# Patient Record
Sex: FEMALE | Race: WHITE | NOT HISPANIC OR LATINO | URBAN - METROPOLITAN AREA
[De-identification: names, ages, dates, MRNs, and addresses within clinical notes are randomized per-mention and may not be internally consistent; named-entity substitution may affect disease eponyms.]

---

## 2017-06-03 ENCOUNTER — INPATIENT (INPATIENT)
Facility: HOSPITAL | Age: 52
LOS: 1 days | Discharge: HOME | DRG: 103 | End: 2017-06-05
Attending: HOSPITALIST | Admitting: HOSPITALIST
Payer: COMMERCIAL

## 2017-06-03 VITALS — WEIGHT: 134.92 LBS | OXYGEN SATURATION: 99 % | HEIGHT: 62 IN

## 2017-06-03 DIAGNOSIS — Z29.9 ENCOUNTER FOR PROPHYLACTIC MEASURES, UNSPECIFIED: ICD-10-CM

## 2017-06-03 DIAGNOSIS — H53.9 UNSPECIFIED VISUAL DISTURBANCE: ICD-10-CM

## 2017-06-03 DIAGNOSIS — Z90.710 ACQUIRED ABSENCE OF BOTH CERVIX AND UTERUS: Chronic | ICD-10-CM

## 2017-06-03 DIAGNOSIS — G43.909 MIGRAINE, UNSPECIFIED, NOT INTRACTABLE, WITHOUT STATUS MIGRAINOSUS: ICD-10-CM

## 2017-06-03 DIAGNOSIS — G43.109 MIGRAINE WITH AURA, NOT INTRACTABLE, WITHOUT STATUS MIGRAINOSUS: ICD-10-CM

## 2017-06-03 DIAGNOSIS — G45.9 TRANSIENT CEREBRAL ISCHEMIC ATTACK, UNSPECIFIED: ICD-10-CM

## 2017-06-03 DIAGNOSIS — M12.811 OTHER SPECIFIC ARTHROPATHIES, NOT ELSEWHERE CLASSIFIED, RIGHT SHOULDER: ICD-10-CM

## 2017-06-03 LAB
ALBUMIN SERPL ELPH-MCNC: 4.4 G/DL — SIGNIFICANT CHANGE UP (ref 3.3–5.2)
ALP SERPL-CCNC: 48 U/L — SIGNIFICANT CHANGE UP (ref 40–120)
ALT FLD-CCNC: 16 U/L — SIGNIFICANT CHANGE UP
ANION GAP SERPL CALC-SCNC: 13 MMOL/L — SIGNIFICANT CHANGE UP (ref 5–17)
APTT BLD: 29.4 SEC — SIGNIFICANT CHANGE UP (ref 27.5–37.4)
AST SERPL-CCNC: 23 U/L — SIGNIFICANT CHANGE UP
BASOPHILS # BLD AUTO: 0 K/UL — SIGNIFICANT CHANGE UP (ref 0–0.2)
BASOPHILS NFR BLD AUTO: 0.2 % — SIGNIFICANT CHANGE UP (ref 0–2)
BILIRUB SERPL-MCNC: 0.4 MG/DL — SIGNIFICANT CHANGE UP (ref 0.4–2)
BUN SERPL-MCNC: 12 MG/DL — SIGNIFICANT CHANGE UP (ref 8–20)
CALCIUM SERPL-MCNC: 9.1 MG/DL — SIGNIFICANT CHANGE UP (ref 8.6–10.2)
CHLORIDE SERPL-SCNC: 101 MMOL/L — SIGNIFICANT CHANGE UP (ref 98–107)
CO2 SERPL-SCNC: 26 MMOL/L — SIGNIFICANT CHANGE UP (ref 22–29)
CREAT SERPL-MCNC: 0.59 MG/DL — SIGNIFICANT CHANGE UP (ref 0.5–1.3)
CRP SERPL-MCNC: 0.1 MG/DL — SIGNIFICANT CHANGE UP (ref 0–0.4)
EOSINOPHIL # BLD AUTO: 0 K/UL — SIGNIFICANT CHANGE UP (ref 0–0.5)
EOSINOPHIL NFR BLD AUTO: 0.5 % — SIGNIFICANT CHANGE UP (ref 0–6)
ERYTHROCYTE [SEDIMENTATION RATE] IN BLOOD: 11 MM/HR — SIGNIFICANT CHANGE UP (ref 0–20)
GLUCOSE SERPL-MCNC: 126 MG/DL — HIGH (ref 70–115)
HBA1C BLD-MCNC: 5.1 % — SIGNIFICANT CHANGE UP (ref 4–5.6)
HCG SERPL-ACNC: <2 MIU/ML — SIGNIFICANT CHANGE UP
HCT VFR BLD CALC: 41 % — SIGNIFICANT CHANGE UP (ref 37–47)
HGB BLD-MCNC: 14.2 G/DL — SIGNIFICANT CHANGE UP (ref 12–16)
INR BLD: 0.96 RATIO — SIGNIFICANT CHANGE UP (ref 0.88–1.16)
LYMPHOCYTES # BLD AUTO: 1.6 K/UL — SIGNIFICANT CHANGE UP (ref 1–4.8)
LYMPHOCYTES # BLD AUTO: 25.7 % — SIGNIFICANT CHANGE UP (ref 20–55)
MCHC RBC-ENTMCNC: 31.6 PG — HIGH (ref 27–31)
MCHC RBC-ENTMCNC: 34.6 G/DL — SIGNIFICANT CHANGE UP (ref 32–36)
MCV RBC AUTO: 91.3 FL — SIGNIFICANT CHANGE UP (ref 81–99)
MONOCYTES # BLD AUTO: 0.7 K/UL — SIGNIFICANT CHANGE UP (ref 0–0.8)
MONOCYTES NFR BLD AUTO: 11.4 % — HIGH (ref 3–10)
NEUTROPHILS # BLD AUTO: 3.7 K/UL — SIGNIFICANT CHANGE UP (ref 1.8–8)
NEUTROPHILS NFR BLD AUTO: 61.9 % — SIGNIFICANT CHANGE UP (ref 37–73)
PLATELET # BLD AUTO: 276 K/UL — SIGNIFICANT CHANGE UP (ref 150–400)
POTASSIUM SERPL-MCNC: 4 MMOL/L — SIGNIFICANT CHANGE UP (ref 3.5–5.3)
POTASSIUM SERPL-SCNC: 4 MMOL/L — SIGNIFICANT CHANGE UP (ref 3.5–5.3)
PROT SERPL-MCNC: 6.9 G/DL — SIGNIFICANT CHANGE UP (ref 6.6–8.7)
PROTHROM AB SERPL-ACNC: 10.6 SEC — SIGNIFICANT CHANGE UP (ref 9.8–12.7)
RBC # BLD: 4.49 M/UL — SIGNIFICANT CHANGE UP (ref 4.4–5.2)
RBC # FLD: 13.2 % — SIGNIFICANT CHANGE UP (ref 11–15.6)
SODIUM SERPL-SCNC: 140 MMOL/L — SIGNIFICANT CHANGE UP (ref 135–145)
TROPONIN T SERPL-MCNC: <0.01 NG/ML — SIGNIFICANT CHANGE UP (ref 0–0.06)
WBC # BLD: 6 K/UL — SIGNIFICANT CHANGE UP (ref 4.8–10.8)
WBC # FLD AUTO: 6 K/UL — SIGNIFICANT CHANGE UP (ref 4.8–10.8)

## 2017-06-03 PROCEDURE — 93880 EXTRACRANIAL BILAT STUDY: CPT | Mod: 26

## 2017-06-03 PROCEDURE — 99223 1ST HOSP IP/OBS HIGH 75: CPT | Mod: GC

## 2017-06-03 PROCEDURE — 99285 EMERGENCY DEPT VISIT HI MDM: CPT

## 2017-06-03 PROCEDURE — 70450 CT HEAD/BRAIN W/O DYE: CPT | Mod: 26

## 2017-06-03 PROCEDURE — 93010 ELECTROCARDIOGRAM REPORT: CPT

## 2017-06-03 RX ORDER — ASPIRIN/CALCIUM CARB/MAGNESIUM 324 MG
325 TABLET ORAL ONCE
Qty: 0 | Refills: 0 | Status: COMPLETED | OUTPATIENT
Start: 2017-06-03 | End: 2017-06-03

## 2017-06-03 RX ORDER — ATORVASTATIN CALCIUM 80 MG/1
20 TABLET, FILM COATED ORAL AT BEDTIME
Qty: 0 | Refills: 0 | Status: DISCONTINUED | OUTPATIENT
Start: 2017-06-03 | End: 2017-06-04

## 2017-06-03 RX ORDER — HEPARIN SODIUM 5000 [USP'U]/ML
5000 INJECTION INTRAVENOUS; SUBCUTANEOUS EVERY 12 HOURS
Qty: 0 | Refills: 0 | Status: DISCONTINUED | OUTPATIENT
Start: 2017-06-03 | End: 2017-06-05

## 2017-06-03 RX ORDER — ASPIRIN/CALCIUM CARB/MAGNESIUM 324 MG
81 TABLET ORAL DAILY
Qty: 0 | Refills: 0 | Status: DISCONTINUED | OUTPATIENT
Start: 2017-06-03 | End: 2017-06-05

## 2017-06-03 RX ORDER — PANTOPRAZOLE SODIUM 20 MG/1
40 TABLET, DELAYED RELEASE ORAL
Qty: 0 | Refills: 0 | Status: DISCONTINUED | OUTPATIENT
Start: 2017-06-03 | End: 2017-06-05

## 2017-06-03 RX ADMIN — ATORVASTATIN CALCIUM 20 MILLIGRAM(S): 80 TABLET, FILM COATED ORAL at 22:03

## 2017-06-03 RX ADMIN — Medication 325 MILLIGRAM(S): at 13:05

## 2017-06-03 RX ADMIN — HEPARIN SODIUM 5000 UNIT(S): 5000 INJECTION INTRAVENOUS; SUBCUTANEOUS at 18:59

## 2017-06-03 NOTE — H&P ADULT - NSHPPHYSICALEXAM_GEN_ALL_CORE
Gen: middle aged female in nad   HEENT: eomi, perrla, neck supple   CVS: s1s2nl, no m/r/g RRR  Lungs: clear   Abd: soft, nt, nd, bs +  Ext: no c/c/e  Neuro: AAOx3, CN II-XII grossly intact, MOtor b/l all ext 5/5, no nystagmus. Sensory intact

## 2017-06-03 NOTE — H&P ADULT - NSHPLABSRESULTS_GEN_ALL_CORE
14.2   6.0   )-----------( 276      ( 03 Jun 2017 12:19 )             41.0   06-03    140  |  101  |  12.0  ----------------------------<  126<H>  4.0   |  26.0  |  0.59    Ca    9.1      03 Jun 2017 12:19    TPro  6.9  /  Alb  4.4  /  TBili  0.4  /  DBili  x   /  AST  23  /  ALT  16  /  AlkPhos  48  06-03    ct brain: negative

## 2017-06-03 NOTE — ED ADULT NURSE REASSESSMENT NOTE - NS ED NURSE REASSESS COMMENT FT1
Dr Ann, admitting MD at bedside to admit pt.
Pt sitting in stretcher, A & Ox3, respirations are even & unlabored. Pt offers no complaints at this time. Waiting for room assignment, aware of plan of care.

## 2017-06-03 NOTE — ED PROVIDER NOTE - NS ED ROS FT
Review of Systems:  	•	CONSTITUTIONAL : no fever or weight change  	•	SKIN : no rash  	•	HEMATOLOGIC : no petechia, no bruising  	•	EYES : no eye pain, no blurred vision  	•	ENT : no change in hearing, no sore throat  	•	RESPIRATORY : no shortness of breath, no cough  	•	CARDIAC : no chest pain, no palpitations  	•	GI : no abd pain, no nausea, no vomiting, no diarrhea, no constipation, no bleeding   	•	MUSCULOSKELETAL : right shoulder pain  	•	NEUROLOGIC : blurry vision of right eye

## 2017-06-03 NOTE — ED ADULT TRIAGE NOTE - CHIEF COMPLAINT QUOTE
Patient arrived to ED today with c/o losing vision in her right eye for about 10 minutes at 0930 this morning.  Patient states vision was blurry, then black, then when vision started to return it was distorted.

## 2017-06-03 NOTE — H&P ADULT - PROBLEM SELECTOR PLAN 2
unlikely to be solely a migraine with aura at this time, Would want to ensure that she is not having a tia   may benefit due to age, sex and hx to have a carotid bubble study to eval for pfo

## 2017-06-03 NOTE — STROKE CODE NOTE - ABSOLUTE EXCLUSION OTHER CRITERIA
Dr Morales Durham, Neuro telestroke states pt will not benefit from tPA, symptoms resolved at this time and CT scan Negative, however MRI/MRA ordered for further confirmation.

## 2017-06-03 NOTE — ED PROVIDER NOTE - MEDICAL DECISION MAKING DETAILS
Pt with visual changes that have resolved. NIH stroke scale 0. CT negative. Consulted with Dr. Durham neurology who recommends admission MRI/MRA and aspirin. r/o TIA vs complicated migraine. Pt with visual changes that have resolved. NIH stroke scale 0. CT negative. Consulted with Dr. Durham neurology who recommends admission MRI/MRA and aspirin. r/o TIA vs migraine with aura.

## 2017-06-03 NOTE — CONSULT NOTE ADULT - SUBJECTIVE AND OBJECTIVE BOX
HPI: 52 y/o RH female who follows with Dr José at [facility] with pmhx of complicated migraine, breast lump s/p resection with marker left in for follow up who, recently dx right UE mild carpel tunnel and right rotator cuff arthritis who presents with complaint of transient right blurry vision. Per patient, she woke up in her USOH, was folding laundry and felt that her right eye became blurry, then loss of vision completely (only right eye) and then finally with blurry vision where she felt that she was looking through a stained/sparkly glass. She states that this lasted about 10-15 seconds and then subsided. She went up the stairs to see her   and then decided to come to the ER. She has been having chronic right upper extremity numbness and tingling with her rotator cuff arthritis and was recently dx with mild carpel tunnel syndrome. She felt that during this episode, her numbness was more pronounced.   Everything subsided by the time she came to the ER   Prior MRI brain for migraines when they first started, never had this degree of sx with her migraines in the past and was last performed appx 7 yrs ago as per pt.  Now fatigue and called for neurological evaluation.      PAST MEDICAL & SURGICAL HISTORY:  Rotator cuff arthropathy, right  Migraines  H/O abdominal hysterectomy: 10 yrs ago    MEDICATIONS  (STANDING):  heparin  Injectable 5000Unit(s) SubCutaneous every 12 hours  pantoprazole    Tablet 40milliGRAM(s) Oral before breakfast  atorvastatin 20milliGRAM(s) Oral at bedtime  aspirin enteric coated 81milliGRAM(s) Oral daily    MEDICATIONS  (PRN):    Allergies    No Known Allergies    Intolerances        FAMILY HISTORY:  No pertinent family history in first degree relatives          SOCIAL HISTORY:  Denies toxic habits;     REVIEW OF SYSTEMS:    As noted in the HPI.    VITAL SIGNS:  Vital Signs Last 24 Hrs  T(C): 36.6, Max: 36.6 (06-03 @ 11:34)  T(F): 97.9, Max: 97.9 (06-03 @ 11:34)  HR: 67 (67 - 78)  BP: 110/63 (110/63 - 124/68)  BP(mean): --  RR: 20 (18 - 20)  SpO2: 99% (99% - 100%)    PHYSICAL EXAMINATION:  General: Well-developed, well nourished, in no acute distress.  Eyes: Conjunctiva and sclera clear. Fundoscopic examination was deferred.  Neck: Supple.  Cardiac: +S1 & S2; Regular.  Chest: CTA b/l.    Musculoskeletal: No tenderness on palpation of spine.   No Brudzinski/Kernig's sign.    Neurologic:  - Mental Status:  Alert, awake, oriented to person, place, and time; Speech is fluent with intact naming, repetition, and comprehension  Cranial Nerves II-XII:    II:  Visual acuity is normal for age ; Visual fields are full to confrontation; Pupils are equal, round, and reactive to light.  III, IV, VI:  Extraocular movements are intact without nystagmus.  V:  Facial sensation is intact in the V1-V3 distribution bilaterally.  VII:  Face is symmetric with normal eye closure and smile  VIII:  Hearing is intact and symmetric for age  IX, X:  Uvula is midline and soft palate rises symmetrically  XI:  Head turning and shoulder shrug are intact.  XII:  Tongue protrudes in the midline.  - Motor:  Strength is 5/5 throughout.  There is no pronator drift.  Normal muscle bulk and tone throughout.  - Reflexes:  2+ and symmetric throughout.  - Sensory:  Intact and symmetric to light touch, and joint-position sense.  - Coordination:  No dysmetria/dysdiadochokinesis.   - Gait: Deferred.      LABS:                          14.2   6.0   )-----------( 276      ( 03 Jun 2017 12:19 )             41.0     03 Jun 2017 12:19    140    |  101    |  12.0   ----------------------------<  126    4.0     |  26.0   |  0.59     Ca    9.1        03 Jun 2017 12:19    TPro  6.9    /  Alb  4.4    /  TBili  0.4    /  DBili  x      /  AST  23     /  ALT  16     /  AlkPhos  48     03 Jun 2017 12:19    LIVER FUNCTIONS - ( 03 Jun 2017 12:19 )  Alb: 4.4 g/dL / Pro: 6.9 g/dL / ALK PHOS: 48 U/L / ALT: 16 U/L / AST: 23 U/L / GGT: x           PT/INR - ( 03 Jun 2017 12:19 )   PT: 10.6 sec;   INR: 0.96 ratio         PTT - ( 03 Jun 2017 12:19 )  PTT:29.4 sec      RADIOLOGY & ADDITIONAL STUDIES:      CT head without contrast 6/3/17 - no acute intracranial pathology.    IMPRESSION:  Transient visual disturbance with dysthesia likely Migraine related.  Nonetheless to further assess for TIA.

## 2017-06-03 NOTE — H&P ADULT - HISTORY OF PRESENT ILLNESS
Patient is a 52 y/o female with pmhx of complicated migraine, breast lump s/p resection with marker left in for follow up who, recently dx right UE mild carpel tunnel and right rotator cuff arthiritis who presents with complaint of transient right blurry vision. Per patient, she woke up in her USOH, was folding laundry (not bending over) and felt that her right eye became blurry, then loss of vision completely (only right eye) and then finally with blurry vision where she felt that she was looking through a stained/sparkly glass. She states that this lasted about 10 minutes and then subsided. She went up the stairs to see her  and tell him what was happening and then decided to come to the ER. She has been having chronic right upper extremity numbness and tingling with her rotator cuff arthiritis and was recently dx with mild carpel tunnel syndrome. She felt that during this episode, her numbness was more pronounced.   Everything subsided by the time she came to the ER   Prior MRI brain for migraines when they first started, never had this degree of sx with her migraines in the past.   No headache now

## 2017-06-03 NOTE — STROKE CODE NOTE - PROBLEM SELECTOR PLAN 1
Plan:   - Risks, benefits and adverse reactions associated with IV tPA including hemorrhagic stroke were discussed with patient in detail. As she reports feeling back to normal and without any focal neurological findings on exam, risks of IV tPA would outweigh the potential benefits and she agreed up on not being treated wit IV tPA   - Start with Aspirin   - MRI brain   - Further evaluation and management as per neurologist at [facility]     Above mentioned plan was discussed with patient and [facility] ED MD in detail. All the questions were answered and concerns were addressed.

## 2017-06-03 NOTE — ED STATDOCS - DETAILS:
I, Lynnette Ny, performed the initial face to face bedside interview with this patient regarding history of present illness, review of symptoms and relevant past medical, social and family history.  I completed an independent physical examination.  I was the initial provider who evaluated this patient.   The history, relevant review of systems, past medical and surgical history, medical decision making, and physical examination was documented by the scribe in my presence and I attest to the accuracy of the documentation.

## 2017-06-03 NOTE — H&P ADULT - NSHPREVIEWOFSYSTEMS_GEN_ALL_CORE
No chest pain, palpitations, sob, light headedness/dizziness, difficulty breathing/cough, fevers/chills, abdominal pain, n/v, diarrhea/constipation, dysuria or increased urinary frequency.  No recent illness, sick contacts, travel   No h/o anxiety

## 2017-06-03 NOTE — STROKE CODE NOTE - ASSESSMENT/PLAN
50 Y/O woman with history of migraines is seen in the [facility] ED through tele-stroke for evaluation and management of R eye vision disturbance. She reports complete resolution of her neurological symptoms and her neurological exam is non focal. CT brain did not show any acute intracranial pathology.     Impression:  Migraine with aura vs. cerebral/retinal embolism without retinal or cerebral infarction – TIA/amaurosis fugax.

## 2017-06-03 NOTE — ED ADULT NURSE NOTE - OBJECTIVE STATEMENT
PT c/o sudden onset of blurred vision in the right eye where eye sight went blank and pt states she was unable to see then vision returned and she states she saw "sparkles" pt states her vision at this time has returned to normal, pt states she also had slight headache but denies headache at this time. Pt is A & O x3, respirations are even & unlabored.  Pt states she has torn rotator cuff and numbness in normal in her right arm, however pt states it feels worse now but also admits to painting yesterday. Pt was evaluated by Dr Morales Durham neuro MD via telestroke and will be admitted for MRI/MRA further stroke workup.  Pt is aware of plan of care.

## 2017-06-03 NOTE — H&P ADULT - PROBLEM SELECTOR PLAN 1
r/o cva vs tia  admit to stroke unit   neurochecks as routine - NIH 0  Neuro eval called Dr Rodrigues [facility]   lipid panel, tsh, a1c   aspirin, statin   MRI brain   echo  US carotids

## 2017-06-03 NOTE — H&P ADULT - ASSESSMENT
Patient is a 52 y/o female with pmhx of complicated migraine, breast lump s/p resection with marker left in for follow up who, recently dx right UE mild carpel tunnel and right rotator cuff arthiritis who presents with complaint of transient right blurry vision. r/o CVA. Seen by tele stroke who rec admission

## 2017-06-03 NOTE — CONSULT NOTE ADULT - PROBLEM SELECTOR RECOMMENDATION 9
TIA protocol/monitored bed.  Baby ASA now and avoid triptans.  MRI Brain/MRA head/neck.  Echo.  DVT prophylaxis.  D/w pt.  Blood serology.  Will follow.

## 2017-06-03 NOTE — STROKE CODE NOTE - SUBJECTIVE
52 Y/O woman was last normal at around 9:30 AM, when she noticed vision disturbance in the R eye (mono-ocular as she tried to close one of the eyes at a time). She started noticing sparkles in the R eye vision associated with blurring of the vision, which progressed rapidly to loss of vision followed by gradual improvement in the vision back to normal. Total episode lasted for about 10 minutes with complete resolution of the vision disturbance. Of note, she also repots to have R hand/forearm sensory paresthesia lasting off and on since last few months, which were diagnosed as CTS. Her sensory paresthesia has been going on since at least yesterday night.

## 2017-06-03 NOTE — ED STATDOCS - PROGRESS NOTE DETAILS
50 y/o F presents to the ED c/o sudden onset visual changes and tingling and numbness to the right upper and lower extremities at 0930 today. Some visual return described as "seeing sparkly lights." Code stroke called immediately and pt sent to Main A side for further evaluation.

## 2017-06-03 NOTE — ED PROVIDER NOTE - OBJECTIVE STATEMENT
Pt presents to the ED with complaints of right eye visual disturbance. States around 9:30 am developed blurry vision which then went black then sparkles in her vision of the right eye. States its completely normal now. She has also had some tingling/numbness sensation from her right hand digits to forearm which she has had intermittently for extended period of time. States that she had and mri and saw a neurologist and symptoms felt to come from her rotator cuff. She also has a cervical disc bulge. She has hx of migraines.

## 2017-06-04 DIAGNOSIS — E04.1 NONTOXIC SINGLE THYROID NODULE: ICD-10-CM

## 2017-06-04 LAB
ANION GAP SERPL CALC-SCNC: 13 MMOL/L — SIGNIFICANT CHANGE UP (ref 5–17)
BUN SERPL-MCNC: 12 MG/DL — SIGNIFICANT CHANGE UP (ref 8–20)
CALCIUM SERPL-MCNC: 8.9 MG/DL — SIGNIFICANT CHANGE UP (ref 8.6–10.2)
CHLORIDE SERPL-SCNC: 103 MMOL/L — SIGNIFICANT CHANGE UP (ref 98–107)
CHOLEST SERPL-MCNC: 209 MG/DL — HIGH (ref 110–199)
CO2 SERPL-SCNC: 23 MMOL/L — SIGNIFICANT CHANGE UP (ref 22–29)
CREAT SERPL-MCNC: 0.68 MG/DL — SIGNIFICANT CHANGE UP (ref 0.5–1.3)
GLUCOSE SERPL-MCNC: 100 MG/DL — SIGNIFICANT CHANGE UP (ref 70–115)
HBA1C BLD-MCNC: 5.2 % — SIGNIFICANT CHANGE UP (ref 4–5.6)
HDLC SERPL-MCNC: 66 MG/DL — SIGNIFICANT CHANGE UP
LIPID PNL WITH DIRECT LDL SERPL: 125 MG/DL — SIGNIFICANT CHANGE UP
POTASSIUM SERPL-MCNC: 3.7 MMOL/L — SIGNIFICANT CHANGE UP (ref 3.5–5.3)
POTASSIUM SERPL-SCNC: 3.7 MMOL/L — SIGNIFICANT CHANGE UP (ref 3.5–5.3)
SODIUM SERPL-SCNC: 139 MMOL/L — SIGNIFICANT CHANGE UP (ref 135–145)
TOTAL CHOLESTEROL/HDL RATIO MEASUREMENT: 3 RATIO — LOW (ref 3.3–7.1)
TRIGL SERPL-MCNC: 91 MG/DL — SIGNIFICANT CHANGE UP (ref 10–200)
TSH SERPL-MCNC: 1.08 UIU/ML — SIGNIFICANT CHANGE UP (ref 0.27–4.2)

## 2017-06-04 PROCEDURE — 99233 SBSQ HOSP IP/OBS HIGH 50: CPT

## 2017-06-04 PROCEDURE — 70551 MRI BRAIN STEM W/O DYE: CPT | Mod: 26

## 2017-06-04 PROCEDURE — 70547 MR ANGIOGRAPHY NECK W/O DYE: CPT | Mod: 26

## 2017-06-04 PROCEDURE — 70544 MR ANGIOGRAPHY HEAD W/O DYE: CPT | Mod: 26,59

## 2017-06-04 RX ORDER — ASPIRIN/CALCIUM CARB/MAGNESIUM 324 MG
1 TABLET ORAL
Qty: 0 | Refills: 0 | DISCHARGE
Start: 2017-06-04

## 2017-06-04 RX ORDER — SIMVASTATIN 20 MG/1
1 TABLET, FILM COATED ORAL
Qty: 30 | Refills: 0
Start: 2017-06-04 | End: 2017-07-04

## 2017-06-04 RX ORDER — SIMVASTATIN 20 MG/1
10 TABLET, FILM COATED ORAL AT BEDTIME
Qty: 0 | Refills: 0 | Status: DISCONTINUED | OUTPATIENT
Start: 2017-06-04 | End: 2017-06-05

## 2017-06-04 RX ADMIN — HEPARIN SODIUM 5000 UNIT(S): 5000 INJECTION INTRAVENOUS; SUBCUTANEOUS at 17:49

## 2017-06-04 RX ADMIN — PANTOPRAZOLE SODIUM 40 MILLIGRAM(S): 20 TABLET, DELAYED RELEASE ORAL at 06:23

## 2017-06-04 RX ADMIN — Medication 81 MILLIGRAM(S): at 11:32

## 2017-06-04 RX ADMIN — SIMVASTATIN 10 MILLIGRAM(S): 20 TABLET, FILM COATED ORAL at 20:47

## 2017-06-04 RX ADMIN — HEPARIN SODIUM 5000 UNIT(S): 5000 INJECTION INTRAVENOUS; SUBCUTANEOUS at 06:23

## 2017-06-04 NOTE — DISCHARGE NOTE ADULT - PATIENT PORTAL LINK FT
“You can access the [facility] Patient Portal, offered by [facility], by registering with the following website: [email or web address]”

## 2017-06-04 NOTE — DISCHARGE NOTE ADULT - NS AS DC STROKE ED MATERIALS
Risk Factors for Stroke/Prescribed Medications/Call 911 for Stroke/Stroke Warning Signs and Symptoms/Stroke Education Booklet/Need for Followup After Discharge

## 2017-06-04 NOTE — DISCHARGE NOTE ADULT - MEDICATION SUMMARY - MEDICATIONS TO TAKE
I will START or STAY ON the medications listed below when I get home from the hospital:    aspirin 81 mg oral delayed release tablet  -- 1 tab(s) by mouth once a day  -- Indication: For Prophylactic measure    simvastatin 10 mg oral tablet  -- 1 tab(s) by mouth once a day (at bedtime)  -- Indication: For High cholesterol    multivitamin  -- 1 tab(s) by mouth once a day  -- Indication: For nutritional

## 2017-06-04 NOTE — DISCHARGE NOTE ADULT - OTHER SIGNIFICANT FINDINGS
ct brain:   IMPRESSION: No acute intracranial hemorrhage, mass effect, or midline   shift.    If there is clinical suspicion of acute ischemia, consider further   evaluation with MRI examination, if there are no clinical   contraindications.    US carotids:       IMPRESSION:    1.  Right carotid artery: No evidence of hemodynamically significant   stenosis.   2.  Left carotid artery: No evidence of hemodynamically significant   stenosis.  3.  Vertebral arteries: Antegrade flow.  4.  Other: Multiple thyroid nodules, measuring up to 2.4 cm on the left.   Consider further evaluation with thyroid ultrasound and biopsy as   clinically warranted. ct brain:   IMPRESSION: No acute intracranial hemorrhage, mass effect, or midline   shift.    If there is clinical suspicion of acute ischemia, consider further   evaluation with MRI examination, if there are no clinical   contraindications.    US carotids:       IMPRESSION:    1.  Right carotid artery: No evidence of hemodynamically significant   stenosis.   2.  Left carotid artery: No evidence of hemodynamically significant   stenosis.  3.  Vertebral arteries: Antegrade flow.  4.  Other: Multiple thyroid nodules, measuring up to 2.4 cm on the left.   Consider further evaluation with thyroid ultrasound and biopsy as   clinically warranted.    MRI brain, MRA head/neck: FINDINGS:  There is no abnormal restricted diffusion to suggest acute infarction.   Normal signal is demonstrated throughout the brain parenchyma. Normal T2   flow-voids are seen within  the intracranial vasculature. The lateral   ventricles and cortical sulci are age-appropriate in size and   configuration. There is no mass, mass effect, or extra-axial fluid   collection. There is no susceptibility artifact to suggest hemorrhage.   Midline structures are normal.  The visualized paranasal sinuses, mastoid   air cells and orbits are normal. Tiny nonspecific bilateral parotid gland   T2 hyperintensities likely representing tiny lymph nodes.      IMPRESSION: No acute infarction. Unremarkable MRA     Echo - pending read - to be called with result

## 2017-06-04 NOTE — DISCHARGE NOTE ADULT - CARE PROVIDER_API CALL
Nadir Nielsen), Neurology  [address] [place] [address]  Phone: [phone]  Fax: [phone]    Mark Nielsen (MATIAS), Internal Medicine  [address] [place] [address]  Phone: [phone]  Fax: [phone]

## 2017-06-04 NOTE — DISCHARGE NOTE ADULT - HOSPITAL COURSE
Patient is a 52 y/o female with pmhx of complicated migraine, breast lump s/p resection with marker left in for follow up who, recently dx right UE mild carpel tunnel and right rotator cuff arthiritis who presents with complaint of transient right blurry vision. r/o CVA. Seen by tele stroke who rec admission. All symptoms resolved within arrival to ED. CT brain negative, US carotids negative but with thyroid nodules - rec OP thyroid US on discharge as TSH normal.  and statin to continue at lower less potent medication. Visual disturbances resolved. RIght arm numbness chronic and related to mild carpel tunnel - f/up with PT as OP   For migraine - rec Neuro f/up for possible carotid bubble study     MRI done -      Plan d/w patient.   Total time coordinating this discharge was 40 minutes. Patient is a 52 y/o female with pmhx of complicated migraine, breast lump s/p resection with marker left in for follow up who, recently dx right UE mild carpel tunnel and right rotator cuff arthiritis who presents with complaint of transient right blurry vision. r/o CVA. Seen by tele stroke who rec admission. All symptoms resolved within arrival to ED. CT brain negative, US carotids negative but with thyroid nodules - rec OP thyroid US on discharge as TSH normal.  and statin to continue at lower less potent medication. Visual disturbances resolved. RIght arm numbness chronic and related to mild carpel tunnel - f/up with PT as OP   For migraine - rec Neuro f/up for possible carotid bubble study     MRI done - negative. Echo done and pending read - to be called in to patient      Plan d/w patient.   Total time coordinating this discharge was 40 minutes.

## 2017-06-04 NOTE — PROGRESS NOTE ADULT - ASSESSMENT
Patient is a 50 y/o female with pmhx of complicated migraine, breast lump s/p resection with marker left in for follow up who, recently dx right UE mild carpel tunnel and right rotator cuff arthiritis who presents with complaint of transient right blurry vision. r/o CVA. Seen by tele stroke who rec admission

## 2017-06-04 NOTE — DISCHARGE NOTE ADULT - ADDITIONAL INSTRUCTIONS
f/up with your primary care provider in 2-3 days. You stated you would like to see a different MD - name is below for ASHLY Nielsen   f/up with Neurology in 1-2 weeks Dr Nielsen

## 2017-06-04 NOTE — DISCHARGE NOTE ADULT - CARE PLAN
Principal Discharge DX:	TIA (transient ischemic attack)  Goal:	follow up with neurology  Instructions for follow-up, activity and diet:	-continue to take simvastatin as your LDL level is a little high as below   -repeat lipid panel In 6 weeks  -daily aspirin until the rest of your migraine work up is complete  -low fat diet   -echocardiogram as outpatient  Secondary Diagnosis:	Migraines  Instructions for follow-up, activity and diet:	-adequate sleep   -oral hydration   -follow up with neurology for a possible carotid bubble study to evaluate for PFO  Secondary Diagnosis:	Thyroid nodule  Instructions for follow-up, activity and diet:	-thyroid ultrasound as outpatient. See report below   TSH normal  Secondary Diagnosis:	Rotator cuff arthropathy, right  Instructions for follow-up, activity and diet:	-as needed tylenol   -follow up with Primary Care MD  Secondary Diagnosis:	Prophylactic measure  Instructions for follow-up, activity and diet:	-multivitamins

## 2017-06-04 NOTE — DISCHARGE NOTE ADULT - PLAN OF CARE
follow up with neurology -continue to take simvastatin as your LDL level is a little high as below   -repeat lipid panel In 6 weeks  -daily aspirin until the rest of your migraine work up is complete  -low fat diet   -echocardiogram as outpatient -adequate sleep   -oral hydration   -follow up with neurology for a possible carotid bubble study to evaluate for PFO -thyroid ultrasound as outpatient. See report below   TSH normal -as needed tylenol   -follow up with Primary Care MD -multivitamins

## 2017-06-04 NOTE — PROGRESS NOTE ADULT - SUBJECTIVE AND OBJECTIVE BOX
Patient is a 51y old  Female who presents with a chief complaint of blurry vision right eye (03 Jun 2017 13:44)    Patient seen and examined at bedside. No acute distress and no acute overnight events. States that her right arm still feels that there is numbness and tingling. NO further visual changes.     TELE: SB o/n 45 - asx.     HEALTH ISSUES - PROBLEM Dx:  TIA (transient ischemic attack): TIA (transient ischemic attack)  Migraines: Migraines  Prophylactic measure: Prophylactic measure  Rotator cuff arthropathy, right: Rotator cuff arthropathy, right  Visual disturbance: Visual disturbance  Migraine with aura and without status migrainosus, not intractable: Migraine with aura and without status migrainosus, not intractable    Vital Signs Last 24 Hrs  T(C): 37.2, Max: 37.2 (06-04 @ 07:47)  T(F): 98.9, Max: 98.9 (06-04 @ 07:47)  HR: 71 (56 - 78)  BP: 108/69 (95/55 - 111/71)  BP(mean): 81 (68 - 86)  RR: 16 (14 - 20)  SpO2: 100% (97% - 100%)    CAPILLARY BLOOD GLUCOSE      PHYSICAL EXAM:      Constitutional: middle aged female in nad     Eyes: eomi, sarah     ENMT: no jvp no thyromegaly noted    Respiratory:clear    Cardiovascular:s1s2nl, no m/r/g RRR    Gastrointestinal:soft, NT, ND bs +    Extremities: no c/c/e     Neurological:grossly intact. no deficits. gait normal       MEDICATIONS  (STANDING):  heparin  Injectable 5000Unit(s) SubCutaneous every 12 hours  pantoprazole    Tablet 40milliGRAM(s) Oral before breakfast  aspirin enteric coated 81milliGRAM(s) Oral daily  simvastatin 10milliGRAM(s) Oral at bedtime    MEDICATIONS  (PRN):      LABS:                        14.2   6.0   )-----------( 276      ( 03 Jun 2017 12:19 )             41.0     06-04    139  |  103  |  12.0  ----------------------------<  100  3.7   |  23.0  |  0.68    Ca    8.9      04 Jun 2017 08:07    TPro  6.9  /  Alb  4.4  /  TBili  0.4  /  DBili  x   /  AST  23  /  ALT  16  /  AlkPhos  48  06-03    PT/INR - ( 03 Jun 2017 12:19 )   PT: 10.6 sec;   INR: 0.96 ratio         PTT - ( 03 Jun 2017 12:19 )  PTT:29.4 sec    LIVER FUNCTIONS - ( 03 Jun 2017 12:19 )  Alb: 4.4 g/dL / Pro: 6.9 g/dL / ALK PHOS: 48 U/L / ALT: 16 U/L / AST: 23 U/L / GGT: x             RADIOLOGY & ADDITIONAL TESTS:

## 2017-06-05 VITALS — TEMPERATURE: 100 F

## 2017-06-05 PROCEDURE — 85027 COMPLETE CBC AUTOMATED: CPT

## 2017-06-05 PROCEDURE — 93005 ELECTROCARDIOGRAM TRACING: CPT

## 2017-06-05 PROCEDURE — 93306 TTE W/DOPPLER COMPLETE: CPT

## 2017-06-05 PROCEDURE — 85730 THROMBOPLASTIN TIME PARTIAL: CPT

## 2017-06-05 PROCEDURE — 84484 ASSAY OF TROPONIN QUANT: CPT

## 2017-06-05 PROCEDURE — 80061 LIPID PANEL: CPT

## 2017-06-05 PROCEDURE — 80053 COMPREHEN METABOLIC PANEL: CPT

## 2017-06-05 PROCEDURE — 93010 ELECTROCARDIOGRAM REPORT: CPT

## 2017-06-05 PROCEDURE — 70551 MRI BRAIN STEM W/O DYE: CPT

## 2017-06-05 PROCEDURE — 99239 HOSP IP/OBS DSCHRG MGMT >30: CPT

## 2017-06-05 PROCEDURE — 86140 C-REACTIVE PROTEIN: CPT

## 2017-06-05 PROCEDURE — 85652 RBC SED RATE AUTOMATED: CPT

## 2017-06-05 PROCEDURE — 80048 BASIC METABOLIC PNL TOTAL CA: CPT

## 2017-06-05 PROCEDURE — 99285 EMERGENCY DEPT VISIT HI MDM: CPT | Mod: 25

## 2017-06-05 PROCEDURE — 85610 PROTHROMBIN TIME: CPT

## 2017-06-05 PROCEDURE — 84443 ASSAY THYROID STIM HORMONE: CPT

## 2017-06-05 PROCEDURE — 70450 CT HEAD/BRAIN W/O DYE: CPT

## 2017-06-05 PROCEDURE — 84702 CHORIONIC GONADOTROPIN TEST: CPT

## 2017-06-05 PROCEDURE — 83036 HEMOGLOBIN GLYCOSYLATED A1C: CPT

## 2017-06-05 PROCEDURE — 93880 EXTRACRANIAL BILAT STUDY: CPT

## 2017-06-05 PROCEDURE — 70544 MR ANGIOGRAPHY HEAD W/O DYE: CPT

## 2017-06-05 PROCEDURE — 70547 MR ANGIOGRAPHY NECK W/O DYE: CPT

## 2017-06-05 RX ADMIN — PANTOPRAZOLE SODIUM 40 MILLIGRAM(S): 20 TABLET, DELAYED RELEASE ORAL at 05:36

## 2017-06-05 RX ADMIN — HEPARIN SODIUM 5000 UNIT(S): 5000 INJECTION INTRAVENOUS; SUBCUTANEOUS at 05:36

## 2017-06-05 NOTE — PROGRESS NOTE ADULT - ATTENDING COMMENTS
Plan d/w patient. Please see discharge papers for details.  at bedside. Will f/up with PCP regarding lipid panel repeat and US thyroid as OP.   I will call the patient and her  with result of echo when we receive them - not likely to  in this patient who unlikely had a neurological event and more likely to be due to her lack of sleep, exertion and h/o migraine   Plan d/w patient and spouse at bedside

## 2017-06-05 NOTE — PROGRESS NOTE ADULT - PROBLEM SELECTOR PLAN 1
resolved issues   MRI and MRA negative   echo done and pending read   US carotids negative   LDL elevated - low dose statin   -a1c noted - negative  -c/w asa
resolved issues   await echo/mri  US carotids negative   LDL elevated - change to low dose statin and diet changes   -a1c noted - negative  -c/w asa

## 2017-06-05 NOTE — PROGRESS NOTE ADULT - SUBJECTIVE AND OBJECTIVE BOX
Patient is a 51y old  Female who presents with a chief complaint of blurry vision right eye (03 Jun 2017 13:44)    Patient seen and examined at bedside. No acute distress and no acute overnight events. She states she is ready to go home.  No further events and no new complaints     TELE: negative     HEALTH ISSUES - PROBLEM Dx:  TIA (transient ischemic attack): TIA (transient ischemic attack)  Migraines: Migraines  Prophylactic measure: Prophylactic measure  Rotator cuff arthropathy, right: Rotator cuff arthropathy, right  Visual disturbance: Visual disturbance  Migraine with aura and without status migrainosus, not intractable: Migraine with aura and without status migrainosus, not intractable    Vital Signs Last 24 Hrs  T(C): 37.5, Max: 37.5 (06-05 @ 10:49)  T(F): 99.5, Max: 99.5 (06-05 @ 10:49)  HR: 78 (63 - 78)  BP: 119/70 (96/54 - 119/70)  BP(mean): 84 (65 - 84)  RR: 16 (15 - 18)  SpO2: 99% (98% - 100%)      PHYSICAL EXAM:      Constitutional: middle aged female in nad     Eyes: eomi, sarah     ENMT: no jvp no thyromegaly noted    Respiratory:clear    Cardiovascular:s1s2nl, no m/r/g RRR    Gastrointestinal:soft, NT, ND bs +    Extremities: no c/c/e     Neurological:grossly intact. no deficits. gait normal     MEDICATIONS  (STANDING):  heparin  Injectable 5000Unit(s) SubCutaneous every 12 hours  pantoprazole    Tablet 40milliGRAM(s) Oral before breakfast  aspirin enteric coated 81milliGRAM(s) Oral daily  simvastatin 10milliGRAM(s) Oral at bedtime    MEDICATIONS  (PRN):      LABS:             no new labs     MRI brain, MRA neck/brain negative

## 2019-08-22 ENCOUNTER — TRANSCRIPTION ENCOUNTER (OUTPATIENT)
Age: 54
End: 2019-08-22

## 2020-06-05 NOTE — PROGRESS NOTE ADULT - PROBLEM SELECTOR PROBLEM 3
Contacted patient regarding previous message. Informed the previous letter expires on June 9th. Will have a new letter written for June 9th and faxed to number in chart. Informed will do fmla paper work for flares like in the past. Patient verbalized understanding.  
Visual disturbance
Visual disturbance

## 2020-10-31 ENCOUNTER — TRANSCRIPTION ENCOUNTER (OUTPATIENT)
Age: 55
End: 2020-10-31

## 2021-02-04 ENCOUNTER — TRANSCRIPTION ENCOUNTER (OUTPATIENT)
Age: 56
End: 2021-02-04

## 2021-06-14 ENCOUNTER — TRANSCRIPTION ENCOUNTER (OUTPATIENT)
Age: 56
End: 2021-06-14

## 2021-10-16 NOTE — PROGRESS NOTE ADULT - PROBLEM SELECTOR PLAN 2
encourage oral hydration   sleep  f/up with Neuro for carotid doppler ? PFO
encourage oral hydration   sleep  f/up with Neuro for carotid doppler ? PFO
Yes

## 2022-01-21 NOTE — ED ADULT NURSE NOTE - PERIPHERAL VASCULAR WDL
Detail Level: Simple Additional Notes: Patient consent was obtained to proceed with the visit and recommended plan of care after discussion of all risks and benefits, including the risks of COVID-19 exposure. Pulses equal bilaterally, no edema present.

## 2022-10-04 NOTE — ED PROVIDER NOTE - DURATION
today Secondary Intention Text (Leave Blank If You Do Not Want): The defect will heal with secondary intention.

## 2023-06-18 ENCOUNTER — NON-APPOINTMENT (OUTPATIENT)
Age: 58
End: 2023-06-18

## 2024-04-07 PROBLEM — M12.811 OTHER SPECIFIC ARTHROPATHIES, NOT ELSEWHERE CLASSIFIED, RIGHT SHOULDER: Chronic | Status: ACTIVE | Noted: 2017-06-03

## 2024-04-07 PROBLEM — G43.909 MIGRAINE, UNSPECIFIED, NOT INTRACTABLE, WITHOUT STATUS MIGRAINOSUS: Chronic | Status: ACTIVE | Noted: 2017-06-03

## 2024-04-12 ENCOUNTER — OUTPATIENT (OUTPATIENT)
Dept: OUTPATIENT SERVICES | Facility: HOSPITAL | Age: 59
LOS: 1 days | End: 2024-04-12
Payer: COMMERCIAL

## 2024-04-12 VITALS
TEMPERATURE: 97 F | WEIGHT: 156.53 LBS | RESPIRATION RATE: 16 BRPM | HEIGHT: 62 IN | OXYGEN SATURATION: 97 % | DIASTOLIC BLOOD PRESSURE: 72 MMHG | SYSTOLIC BLOOD PRESSURE: 114 MMHG | HEART RATE: 77 BPM

## 2024-04-12 DIAGNOSIS — Z29.9 ENCOUNTER FOR PROPHYLACTIC MEASURES, UNSPECIFIED: ICD-10-CM

## 2024-04-12 DIAGNOSIS — Z01.818 ENCOUNTER FOR OTHER PREPROCEDURAL EXAMINATION: ICD-10-CM

## 2024-04-12 DIAGNOSIS — Z12.11 ENCOUNTER FOR SCREENING FOR MALIGNANT NEOPLASM OF COLON: ICD-10-CM

## 2024-04-12 DIAGNOSIS — Z84.89 FAMILY HISTORY OF OTHER SPECIFIED CONDITIONS: ICD-10-CM

## 2024-04-12 DIAGNOSIS — Z90.710 ACQUIRED ABSENCE OF BOTH CERVIX AND UTERUS: Chronic | ICD-10-CM

## 2024-04-12 DIAGNOSIS — Z98.890 OTHER SPECIFIED POSTPROCEDURAL STATES: Chronic | ICD-10-CM

## 2024-04-12 LAB
A1C WITH ESTIMATED AVERAGE GLUCOSE RESULT: 5.7 % — HIGH (ref 4–5.6)
ANION GAP SERPL CALC-SCNC: 13 MMOL/L — SIGNIFICANT CHANGE UP (ref 5–17)
APTT BLD: 28.3 SEC — SIGNIFICANT CHANGE UP (ref 24.5–35.6)
BASOPHILS # BLD AUTO: 0.02 K/UL — SIGNIFICANT CHANGE UP (ref 0–0.2)
BASOPHILS NFR BLD AUTO: 0.4 % — SIGNIFICANT CHANGE UP (ref 0–2)
BUN SERPL-MCNC: 13.8 MG/DL — SIGNIFICANT CHANGE UP (ref 8–20)
CALCIUM SERPL-MCNC: 9.4 MG/DL — SIGNIFICANT CHANGE UP (ref 8.4–10.5)
CHLORIDE SERPL-SCNC: 104 MMOL/L — SIGNIFICANT CHANGE UP (ref 96–108)
CO2 SERPL-SCNC: 25 MMOL/L — SIGNIFICANT CHANGE UP (ref 22–29)
CREAT SERPL-MCNC: 0.76 MG/DL — SIGNIFICANT CHANGE UP (ref 0.5–1.3)
EGFR: 92 ML/MIN/1.73M2 — SIGNIFICANT CHANGE UP
EOSINOPHIL # BLD AUTO: 0.09 K/UL — SIGNIFICANT CHANGE UP (ref 0–0.5)
EOSINOPHIL NFR BLD AUTO: 1.7 % — SIGNIFICANT CHANGE UP (ref 0–6)
ESTIMATED AVERAGE GLUCOSE: 117 MG/DL — HIGH (ref 68–114)
GLUCOSE SERPL-MCNC: 112 MG/DL — HIGH (ref 70–99)
HCT VFR BLD CALC: 43 % — SIGNIFICANT CHANGE UP (ref 34.5–45)
HGB BLD-MCNC: 14.8 G/DL — SIGNIFICANT CHANGE UP (ref 11.5–15.5)
IMM GRANULOCYTES NFR BLD AUTO: 0.4 % — SIGNIFICANT CHANGE UP (ref 0–0.9)
INR BLD: 0.9 RATIO — SIGNIFICANT CHANGE UP (ref 0.85–1.18)
LYMPHOCYTES # BLD AUTO: 1.48 K/UL — SIGNIFICANT CHANGE UP (ref 1–3.3)
LYMPHOCYTES # BLD AUTO: 28.5 % — SIGNIFICANT CHANGE UP (ref 13–44)
MCHC RBC-ENTMCNC: 31.2 PG — SIGNIFICANT CHANGE UP (ref 27–34)
MCHC RBC-ENTMCNC: 34.4 GM/DL — SIGNIFICANT CHANGE UP (ref 32–36)
MCV RBC AUTO: 90.5 FL — SIGNIFICANT CHANGE UP (ref 80–100)
MONOCYTES # BLD AUTO: 0.44 K/UL — SIGNIFICANT CHANGE UP (ref 0–0.9)
MONOCYTES NFR BLD AUTO: 8.5 % — SIGNIFICANT CHANGE UP (ref 2–14)
NEUTROPHILS # BLD AUTO: 3.15 K/UL — SIGNIFICANT CHANGE UP (ref 1.8–7.4)
NEUTROPHILS NFR BLD AUTO: 60.5 % — SIGNIFICANT CHANGE UP (ref 43–77)
PLATELET # BLD AUTO: 288 K/UL — SIGNIFICANT CHANGE UP (ref 150–400)
POTASSIUM SERPL-MCNC: 4.3 MMOL/L — SIGNIFICANT CHANGE UP (ref 3.5–5.3)
POTASSIUM SERPL-SCNC: 4.3 MMOL/L — SIGNIFICANT CHANGE UP (ref 3.5–5.3)
PROTHROM AB SERPL-ACNC: 10 SEC — SIGNIFICANT CHANGE UP (ref 9.5–13)
RBC # BLD: 4.75 M/UL — SIGNIFICANT CHANGE UP (ref 3.8–5.2)
RBC # FLD: 13 % — SIGNIFICANT CHANGE UP (ref 10.3–14.5)
SODIUM SERPL-SCNC: 142 MMOL/L — SIGNIFICANT CHANGE UP (ref 135–145)
WBC # BLD: 5.2 K/UL — SIGNIFICANT CHANGE UP (ref 3.8–10.5)
WBC # FLD AUTO: 5.2 K/UL — SIGNIFICANT CHANGE UP (ref 3.8–10.5)

## 2024-04-12 PROCEDURE — 93005 ELECTROCARDIOGRAM TRACING: CPT

## 2024-04-12 PROCEDURE — 85610 PROTHROMBIN TIME: CPT

## 2024-04-12 PROCEDURE — G0463: CPT

## 2024-04-12 PROCEDURE — 80048 BASIC METABOLIC PNL TOTAL CA: CPT

## 2024-04-12 PROCEDURE — 93010 ELECTROCARDIOGRAM REPORT: CPT

## 2024-04-12 PROCEDURE — 36415 COLL VENOUS BLD VENIPUNCTURE: CPT

## 2024-04-12 PROCEDURE — 85025 COMPLETE CBC W/AUTO DIFF WBC: CPT

## 2024-04-12 PROCEDURE — 85730 THROMBOPLASTIN TIME PARTIAL: CPT

## 2024-04-12 PROCEDURE — 83036 HEMOGLOBIN GLYCOSYLATED A1C: CPT

## 2024-04-12 RX ORDER — ATORVASTATIN CALCIUM 80 MG/1
1 TABLET, FILM COATED ORAL
Refills: 0 | DISCHARGE

## 2024-04-12 NOTE — H&P PST ADULT - HISTORY OF PRESENT ILLNESS
58 yr old female presents with history of HLD, TIA in 2007 (without residual ) FAmily history of malignant hyperthermia ( siblings, pt herself has not been tested) and skin cancer . Pt is scheduled for colonoscopy last one approx 5 yrs ago and pt is due for screening, mom has history of rectal cancer. Pt denies any constipation, abdominal pain , N/V or weight loss. Pt obtained a neuro clearance as requested by DR. Bah. Pt is scheduled on 4/19/24 at [facility].

## 2024-04-12 NOTE — H&P PST ADULT - NSICDXPASTMEDICALHX_GEN_ALL_CORE_FT
PAST MEDICAL HISTORY:  History of malignant hyperthermia     Migraines     Rotator cuff arthropathy, right     Skin cancer, basal cell     TIA (transient ischemic attack)      PAST MEDICAL HISTORY:  Migraines     Rotator cuff arthropathy, right     Skin cancer, basal cell     TIA (transient ischemic attack)

## 2024-04-12 NOTE — H&P PST ADULT - ASSESSMENT
58 yr old female presents with history of HLD, TIA in 2007 (without residual ) FAmily history of malignant hyperthermia ( siblings, pt herself has not been tested) and skin cancer . Pt is scheduled for colonoscopy last one approx 5 yrs ago and pt is due for screening, mom has history of rectal cancer. Pt denies any constipation, abdominal pain , N/V or weight loss. Pt obtained a neuro clearance as requested by DR. Bah. Pt is scheduled on 24 at [facility]. To hols asa as per neurology .  OPIOID RISK TOOL    HUNG EACH BOX THAT APPLIES AND ADD TOTALS AT THE END    FAMILY HISTORY OF SUBSTANCE ABUSE                 FEMALE         MALE                                                Alcohol                             [  ]1 pt          [  ]3pts                                               Illegal Durgs                     [  ]2 pts        [  ]3pts                                               Rx Drugs                           [  ]4 pts        [  ]4 pts    PERSONAL HISTORY OF SUBSTANCE ABUSE                                                                                          Alcohol                             [  ]3 pts       [  ]3 pts                                               Illegal Durgs                     [  ]4 pts        [  ]4 pts                                               Rx Drugs                           [  ]5 pts        [  ]5 pts    AGE BETWEEN 16-45 YEARS                                      [  ]1 pt         [  ]1 pt    HISTORY OF PREADOLESCENT   SEXUAL ABUSE                                                             [  ]3 pts        [  ]0pts    PSYCHOLOGICAL DISEASE                     ADD, OCD, Bipolar, Schizophrenia        [  ]2 pts         [  ]2 pts                      Depression                                               [  ]1 pt           [  ]1 pt           SCORING TOTAL   (add numbers and type here)              (**0*)                                     A score of 3 or lower indicated LOW risk for future opiod abuse  A score of 4 to 7 indicated moderate risk for future opiod abuse  A score of 8 or higher indicates a high risk for opiod abuse  CAPRINI VTE 2.0 SCORE [CLOT updated 2019]    AGE RELATED RISK FACTORS                                                       MOBILITY RELATED FACTORS  [ X] Age 41-60 years                                            (1 Point)                    [ ] Bed rest                                                        (1 Point)  [ ] Age: 61-74 years                                           (2 Points)                  [ ] Plaster cast                                                   (2 Points)  [ ] Age= 75 years                                              (3 Points)                    [ ] Bed bound for more than 72 hours                 (2 Points)    DISEASE RELATED RISK FACTORS                                               GENDER SPECIFIC FACTORS  [ ] Edema in the lower extremities                       (1 Point)              [ ] Pregnancy                                                     (1 Point)  [ ] Varicose veins                                               (1 Point)                     [ ] Post-partum < 6 weeks                                   (1 Point)             X[ ] BMI > 25 Kg/m2                                            (1 Point)                     [ ] Hormonal therapy  or oral contraception          (1 Point)                 [ ] Sepsis (in the previous month)                        (1 Point)               [ ] History of pregnancy complications                 (1 point)  [ ] Pneumonia or serious lung disease                                               [ ] Unexplained or recurrent                     (1 Point)           (in the previous month)                               (1 Point)  [ ] Abnormal pulmonary function test                     (1 Point)                 SURGERY RELATED RISK FACTORS  [ ] Acute myocardial infarction                              (1 Point)               [ ]  Section                                             (1 Point)  [ ] Congestive heart failure (in the previous month)  (1 Point)      [ x] Minor surgery                                                  (1 Point)   [ ] Inflammatory bowel disease                             (1 Point)               [ ] Arthroscopic surgery                                        (2 Points)  [ ] Central venous access                                      (2 Points)                [ ] General surgery lasting more than 45 minutes (2 points)  x[ ] Malignancy- Present or previous                   (2 Points)                [ ] Elective arthroplasty                                         (5 points)    [ ] Stroke (in the previous month)                          (5 Points)                                                                                                                                                           HEMATOLOGY RELATED FACTORS                                                 TRAUMA RELATED RISK FACTORS  [ ] Prior episodes of VTE                                     (3 Points)                [ ] Fracture of the hip, pelvis, or leg                       (5 Points)  [ ] Positive family history for VTE                         (3 Points)             [ ] Acute spinal cord injury (in the previous month)  (5 Points)  [ ] Prothrombin 91487 A                                     (3 Points)               [ ] Paralysis  (less than 1 month)                             (5 Points)  [ ] Factor V Leiden                                             (3 Points)                  [ ] Multiple Trauma within 1 month                        (5 Points)  [ ] Lupus anticoagulants                                     (3 Points)                                                           [ ] Anticardiolipin antibodies                               (3 Points)                                                       [ ] High homocysteine in the blood                      (3 Points)                                             [ ] Other congenital or acquired thrombophilia      (3 Points)                                                [ ] Heparin induced thrombocytopenia                  (3 Points)                                     Total Score [    5      ]

## 2024-04-12 NOTE — H&P PST ADULT - NSICDXFAMILYHX_GEN_ALL_CORE_FT
FAMILY HISTORY:  No pertinent family history in first degree relatives     FAMILY HISTORY:  Mother  Still living? Unknown  Family history of rectal cancer, Age at diagnosis: Age Unknown    Sibling  Still living? Yes, Estimated age: Age Unknown  Family history of malignant hyperthermia, Age at diagnosis: Age Unknown  FH: breast cancer, Age at diagnosis: Age Unknown

## 2024-04-12 NOTE — H&P PST ADULT - NSICDXPASTSURGICALHX_GEN_ALL_CORE_FT
PAST SURGICAL HISTORY:  H/O abdominal hysterectomy 10 yrs ago     PAST SURGICAL HISTORY:  H/O abdominal hysterectomy 10 yrs ago    History of colonoscopy

## 2024-04-12 NOTE — H&P PST ADULT - NS HP PST ANES REACTION OTHER FT
brother and sister has malignant hyperthermia brother and sister have  malignant hyperthermia, pt herself has never been tested

## 2024-04-19 ENCOUNTER — OUTPATIENT (OUTPATIENT)
Dept: OUTPATIENT SERVICES | Facility: HOSPITAL | Age: 59
LOS: 1 days | End: 2024-04-19

## 2024-04-19 ENCOUNTER — TRANSCRIPTION ENCOUNTER (OUTPATIENT)
Age: 59
End: 2024-04-19

## 2024-04-19 DIAGNOSIS — Z98.890 OTHER SPECIFIED POSTPROCEDURAL STATES: Chronic | ICD-10-CM

## 2024-04-19 DIAGNOSIS — Z90.710 ACQUIRED ABSENCE OF BOTH CERVIX AND UTERUS: Chronic | ICD-10-CM

## 2024-04-19 DIAGNOSIS — Z80.0 FAMILY HISTORY OF MALIGNANT NEOPLASM OF DIGESTIVE ORGANS: ICD-10-CM

## 2024-04-19 DIAGNOSIS — Z12.11 ENCOUNTER FOR SCREENING FOR MALIGNANT NEOPLASM OF COLON: ICD-10-CM

## 2024-04-19 PROCEDURE — G0105: CPT

## 2024-04-19 DEVICE — NAIL OSTEO 1.5X16MM STRL: Type: IMPLANTABLE DEVICE | Status: FUNCTIONAL

## 2024-05-19 ENCOUNTER — NON-APPOINTMENT (OUTPATIENT)
Age: 59
End: 2024-05-19

## (undated) DEVICE — CATH IV SAFE BC 22G X 1" (BLUE)

## (undated) DEVICE — TUBING ALARIS PUMP MODULE NON-DEHP

## (undated) DEVICE — MASK PROCED EARLOOP 50/BX LRC COVID ADD

## (undated) DEVICE — UNDERPAD LINEN SAVER 23 X 36"

## (undated) DEVICE — SENSOR O2 FINGER ADULT

## (undated) DEVICE — DENTURE CUP PINK

## (undated) DEVICE — DRSG 2X2

## (undated) DEVICE — SYR SLIP 10CC

## (undated) DEVICE — TUBING IV EXTENSION MACRO W CLAVE 7"

## (undated) DEVICE — FORCEP RADIAL JAW 4 W NDL 2.4MM 2.8MM 240CM ORANGE DISP

## (undated) DEVICE — SYR LUER SLIP TIP 50CC

## (undated) DEVICE — SOL IRR BAG H2O 1000ML

## (undated) DEVICE — SOL BAG NS 0.9% 1000ML

## (undated) DEVICE — PACK IV START WITH CHG

## (undated) DEVICE — SYR IV FLUSH SALINE 10ML 30/TY

## (undated) DEVICE — DRSG CURITY GAUZE SPONGE 4 X 4" 12-PLY NON-STERILE

## (undated) DEVICE — GOWN IMPERV XL

## (undated) DEVICE — KIT DEFENDO 4 OLY 4 PC